# Patient Record
Sex: MALE | Race: BLACK OR AFRICAN AMERICAN
[De-identification: names, ages, dates, MRNs, and addresses within clinical notes are randomized per-mention and may not be internally consistent; named-entity substitution may affect disease eponyms.]

---

## 2018-03-04 ENCOUNTER — HOSPITAL ENCOUNTER (EMERGENCY)
Dept: HOSPITAL 75 - ER | Age: 58
Discharge: HOME | End: 2018-03-04
Payer: SELF-PAY

## 2018-03-04 VITALS — DIASTOLIC BLOOD PRESSURE: 109 MMHG | SYSTOLIC BLOOD PRESSURE: 176 MMHG

## 2018-03-04 VITALS — WEIGHT: 160 LBS | HEIGHT: 72 IN | BODY MASS INDEX: 21.67 KG/M2

## 2018-03-04 DIAGNOSIS — I10: ICD-10-CM

## 2018-03-04 DIAGNOSIS — Z79.84: ICD-10-CM

## 2018-03-04 DIAGNOSIS — E11.9: Primary | ICD-10-CM

## 2018-03-04 PROCEDURE — 99281 EMR DPT VST MAYX REQ PHY/QHP: CPT

## 2018-03-04 NOTE — ED GENERAL
General


Chief Complaint:  General Problems/Pain


Stated Complaint:  DIABETIC OUT OF PILLS/


Source of Information:  Patient, Family


Exam Limitations:  No Limitations





History of Present Illness


Date Seen by Provider:  Mar 4, 2018


Time Seen by Provider:  09:17


Initial Comments


This 57-year-old male presents for medication refills.  Patient is hypertensive 

and diabetic and is out of his metformin and lisinopril.





The patient's blood sugars with his metformin have been running between 170 and 

230.  Patient takes 1000 mg of metformin twice a day.  The patient's blood 

pressure has been treated with lisinopril.  The patient has not been tracking 

his blood pressure at home.





Patient denies headache, blurred vision, stiff neck, syncopal episodes, or 

worsening of any paresthesias to his arms and legs.





Allergies and Home Medications


Allergies


Coded Allergies:  


     No Known Drug Allergies (Unverified , 3/4/18)





Home Medications


Metformin HCl 1,000 Mg Tablet, 1,000 MG PO BID, (Reported)





Patient Home Medication List


Home Medication List Reviewed:  Yes





Constitutional:  No chills, No fever


EENTM:  No hearing loss, No blurred vision


Respiratory:  No cough


Cardiovascular:  No chest pain


Gastrointestinal:  No abdominal pain, No diarrhea, No nausea


Genitourinary:  no symptoms reported


Musculoskeletal:  No back pain


Skin:  no symptoms reported, No rash


Psychiatric/Neurological:  No Symptoms Reported


Hematologic/Lymphatic:  No Symptoms Reported


Immunological/Allergic:  no symptoms reported





Past Medical-Social-Family Hx


Patient Social History


Alcohol Use:  Denies Use


Recreational Drug Use:  Yes


Smoking Status:  Never a Smoker


Recent Foreign Travel:  No


Contact w/Someone Who Travel:  No





Surgeries


History of Surgeries:  No





Respiratory


History of Respiratory Disorde:  No





Cardiovascular


History of Cardiac Disorders:  No





Neurological


History of Neurological Disord:  No





Genitourinary


History of Genitourinary Disor:  No





Gastrointestinal


History of Gastrointestinal Di:  No





Musculoskeletal


History of Musculoskeletal Dis:  No





Endocrine


History of Endocrine Disorders:  Yes


Endocrine Disorders:  Diabetes, Non-Insulin dep





Integumentary


History of Skin or Integumenta:  No





Reviewed Nursing Assessment


Reviewed/Agree w Nursing PMH:  Yes





Physical Exam


Vital Signs





Vital Signs - First Documented








 3/4/18





 08:59


 


Temp 98.0


 


Pulse 79


 


Resp 18


 


B/P (MAP) 176/109 (131)


 


Pulse Ox 98





Capillary Refill :


General Appearance:  No Apparent Distress, WD/WN


Eyes:  Bilateral Eye Normal Inspection


HEENT:  Normal ENT Inspection


Neck:  Normal Inspection


Respiratory:  Chest Non Tender, Lungs Clear, Normal Breath Sounds


Cardiovascular:  Regular Rate, Rhythm, No Edema, No Murmur


Gastrointestinal:  Normal Bowel Sounds, No Organomegaly


Extremity:  Normal Capillary Refill, Normal Inspection, Normal Range of Motion


Neurologic/Psychiatric:  Oriented x3, No Motor/Sensory Deficits, Normal Mood/

Affect


Skin:  Normal Color, Warm/Dry





Progress/Results/Core Measures


Suspected Sepsis


SIRS


Temperature: 


Pulse:  


Respiratory Rate: 


 


Blood Pressure  / 


Mean:





Results/Orders


Vital Signs/I&O





Vital Sign - Last 12Hours








 3/4/18





 08:59


 


Temp 98.0


 


Pulse 79


 


Resp 18


 


B/P (MAP) 176/109 (131)


 


Pulse Ox 98





Capillary Refill :


Progress Note :  


   Time:  09:24


Progress Note


I refilled the patient's metformin 1000 mg twice a day and lisinopril 20 mg 

daily.  I started patient on 10 mg of lisinopril for a week and then ask him to 

increase to his old dose of 20 mg daily if his blood pressure was still 

elevated.  I strongly encouraged him to follow up with his caregiver of choice 

within the next 1-2 weeks for further evaluation.  He was invited to return to 

the emergency department if he had a further problems or questions.





ECG


Initial ECG Impression Date:  Mar 4, 2018





Departure


Impression


Impression:  


 Primary Impression:  


 Diabetes


 Qualified Codes:  E11.9 - Type 2 diabetes mellitus without complications


 Additional Impression:  


 Hypertension


 Qualified Codes:  I10 - Essential (primary) hypertension


Disposition:  01 HOME, SELF-CARE


Condition:  Unchanged





Departure-Patient Inst.


Decision time for Depature:  09:25


Referrals:  


NO,LOCAL PHYSICIAN (PCP)


Primary Care Physician


Patient Instructions:  DIABETES, High Blood Pressure (DC)





Add. Discharge Instructions:  


Metformin and lisinopril as prescribed.  Close follow-up





Choice.  Return if any problems.  All discharge instructions reviewed with 

patient and/or family. Voiced understanding.











CONI SNYDER MD Mar 4, 2018 09:22

## 2018-11-22 ENCOUNTER — HOSPITAL ENCOUNTER (EMERGENCY)
Age: 58
Discharge: HOME | End: 2018-11-22
Attending: EMERGENCY MEDICINE
Payer: SELF-PAY

## 2018-11-22 ENCOUNTER — APPOINTMENT (OUTPATIENT)
Dept: GENERAL RADIOLOGY | Age: 58
End: 2018-11-22
Attending: NURSE PRACTITIONER
Payer: SELF-PAY

## 2018-11-22 VITALS
RESPIRATION RATE: 16 BRPM | OXYGEN SATURATION: 98 % | HEART RATE: 85 BPM | BODY MASS INDEX: 22.35 KG/M2 | TEMPERATURE: 98.2 F | DIASTOLIC BLOOD PRESSURE: 89 MMHG | WEIGHT: 165 LBS | HEIGHT: 72 IN | SYSTOLIC BLOOD PRESSURE: 183 MMHG

## 2018-11-22 DIAGNOSIS — S16.1XXA STRAIN OF NECK MUSCLE, INITIAL ENCOUNTER: ICD-10-CM
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
## 2018-11-22 PROCEDURE — 99283 EMERGENCY DEPT VISIT LOW MDM: CPT | Performed by: NURSE PRACTITIONER

## 2018-11-22 PROCEDURE — 72040 X-RAY EXAM NECK SPINE 2-3 VW: CPT

## 2018-11-22 PROCEDURE — 74011250637 HC RX REV CODE- 250/637: Performed by: NURSE PRACTITIONER

## 2018-11-22 PROCEDURE — 72100 X-RAY EXAM L-S SPINE 2/3 VWS: CPT

## 2018-11-22 RX ORDER — METHOCARBAMOL 750 MG/1
750 TABLET, FILM COATED ORAL 3 TIMES DAILY
Qty: 30 TAB | Refills: 0 | Status: SHIPPED | OUTPATIENT
Start: 2018-11-22

## 2018-11-22 RX ORDER — METFORMIN HYDROCHLORIDE 1000 MG/1
1000 TABLET ORAL 2 TIMES DAILY WITH MEALS
Qty: 60 TAB | Refills: 3 | Status: SHIPPED | OUTPATIENT
Start: 2018-11-22

## 2018-11-22 RX ORDER — LISINOPRIL 20 MG/1
TABLET ORAL DAILY
COMMUNITY
End: 2018-11-22 | Stop reason: SDUPTHER

## 2018-11-22 RX ORDER — MELOXICAM 7.5 MG/1
7.5 TABLET ORAL DAILY
Qty: 7 TAB | Refills: 0 | Status: SHIPPED | OUTPATIENT
Start: 2018-11-22

## 2018-11-22 RX ORDER — METFORMIN HYDROCHLORIDE 1000 MG/1
1000 TABLET ORAL 2 TIMES DAILY WITH MEALS
COMMUNITY
End: 2018-11-22 | Stop reason: SDUPTHER

## 2018-11-22 RX ORDER — LISINOPRIL 20 MG/1
20 TABLET ORAL DAILY
Qty: 30 TAB | Refills: 3 | Status: SHIPPED | OUTPATIENT
Start: 2018-11-22

## 2018-11-22 RX ORDER — LISINOPRIL 20 MG/1
20 TABLET ORAL
Status: COMPLETED | OUTPATIENT
Start: 2018-11-22 | End: 2018-11-22

## 2018-11-22 RX ADMIN — LISINOPRIL 20 MG: 20 TABLET ORAL at 13:14

## 2018-11-22 NOTE — DISCHARGE INSTRUCTIONS
Medications as prescribed  Stretches provided will help with pain. Follow up with primary care for a recheck if symptoms continue or fail to improve  Return to the Emergency Department for any new or worse symptoms.
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
## 2018-11-22 NOTE — ED NOTES
I have reviewed discharge instructions with the patient and spouse. The patient and spouse verbalized understanding. Patient left ED via Discharge Method: ambulatory to Home with spouse Opportunity for questions and clarification provided. Patient given 4 scripts. To continue your aftercare when you leave the hospital, you may receive an automated call from our care team to check in on how you are doing. This is a free service and part of our promise to provide the best care and service to meet your aftercare needs.  If you have questions, or wish to unsubscribe from this service please call [phone]. Thank you for Choosing our [facility] Emergency Department.

## 2021-06-12 ENCOUNTER — HOSPITAL ENCOUNTER (EMERGENCY)
Age: 61
Discharge: HOME | End: 2021-06-12
Attending: EMERGENCY MEDICINE

## 2021-06-12 VITALS
HEIGHT: 72 IN | HEART RATE: 73 BPM | SYSTOLIC BLOOD PRESSURE: 137 MMHG | WEIGHT: 175 LBS | RESPIRATION RATE: 18 BRPM | BODY MASS INDEX: 23.7 KG/M2 | OXYGEN SATURATION: 99 % | DIASTOLIC BLOOD PRESSURE: 91 MMHG | TEMPERATURE: 97.6 F

## 2021-06-12 DIAGNOSIS — Z76.0 MEDICATION REFILL: Primary | ICD-10-CM

## 2021-06-12 LAB
GLUCOSE BLD STRIP.AUTO-MCNC: 183 MG/DL (ref 65–117)
SERVICE CMNT-IMP: ABNORMAL

## 2021-06-12 PROCEDURE — 74011250637 HC RX REV CODE- 250/637: Performed by: EMERGENCY MEDICINE

## 2021-06-12 PROCEDURE — 82962 GLUCOSE BLOOD TEST: CPT

## 2021-06-12 PROCEDURE — 99283 EMERGENCY DEPT VISIT LOW MDM: CPT

## 2021-06-12 RX ORDER — LISINOPRIL 10 MG/1
20 TABLET ORAL DAILY
Qty: 60 TABLET | Refills: 0 | Status: SHIPPED | OUTPATIENT
Start: 2021-06-12 | End: 2021-07-12

## 2021-06-12 RX ORDER — LISINOPRIL 20 MG/1
20 TABLET ORAL
Status: COMPLETED | OUTPATIENT
Start: 2021-06-12 | End: 2021-06-12

## 2021-06-12 RX ORDER — METFORMIN HYDROCHLORIDE 1000 MG/1
1000 TABLET ORAL 2 TIMES DAILY WITH MEALS
Qty: 60 TABLET | Refills: 0 | Status: SHIPPED | OUTPATIENT
Start: 2021-06-12 | End: 2021-07-12

## 2021-06-12 RX ADMIN — LISINOPRIL 20 MG: 20 TABLET ORAL at 11:50

## 2021-06-12 NOTE — ED NOTES
Patient presents to ED with c/o medication refill on high blood pressure and diabetes medication. Patient is alert and oriented x 4 and in no acute distress at this time. Respirations are at a regular rate, depth, and pattern. Patient updated on plan of care and has no questions or concerns at this time. Call bell within reach. Will continue to monitor. Please reference nursing assessment. Emergency Department Nursing Plan of Care       The Nursing Plan of Care is developed from the Nursing assessment and Emergency Department Attending provider initial evaluation. The plan of care may be reviewed in the ED Provider note.     The Plan of Care was developed with the following considerations:   Patient / Family readiness to learn indicated by:verbalized understanding and successful return demonstration  Persons(s) to be included in education: patient  Barriers to Learning/Limitations:No    Signed     Severa Collie, RN    6/12/2021   11:36 AM

## 2021-06-12 NOTE — ED PROVIDER NOTES
EMERGENCY DEPARTMENT HISTORY AND PHYSICAL EXAM      Date: 6/12/2021  Patient Name: Lizbeth Cobb    History of Presenting Illness     Chief Complaint   Patient presents with    Medication Refill     asking fro script for Metformin and Lisinopril       History Provided By: Patient    HPI: Lizbeth Cobb, 62 y.o. male with PMHx significant for DM, HTN, presents ambulatory to the ED for medication refill. States he has been out of his HTN medications x 2 weeks and DM x 8 days. Reports does not currently have insurance. Denies any associated symptoms. Denies any alleviating or exacerbating factors. Pt specifically denies any fever, chills, CP, SOB, abd pain, NVD. There are no other complaints, changes, or physical findings at this time. PCP: None    No current facility-administered medications on file prior to encounter. Current Outpatient Medications on File Prior to Encounter   Medication Sig Dispense Refill    methocarbamol (ROBAXIN) 750 mg tablet Take 1 Tab by mouth three (3) times daily. 30 Tab 0    meloxicam (MOBIC) 7.5 mg tablet Take 1 Tab by mouth daily. 7 Tab 0    lisinopril (PRINIVIL, ZESTRIL) 20 mg tablet Take 1 Tab by mouth daily. 30 Tab 3    metFORMIN (GLUCOPHAGE) 1,000 mg tablet Take 1 Tab by mouth two (2) times daily (with meals). 61 Tab 3       Past History     Past Medical History:  Past Medical History:   Diagnosis Date    Diabetes ([address]     Hypertension        Past Surgical History:  No past surgical history on file. Family History:  No family history on file. Social History:  Social History     Tobacco Use    Smoking status: Current Every Day Smoker   Substance Use Topics    Alcohol use: Yes     Comment: socially    Drug use: Not on file       Allergies:  No Known Allergies      Review of Systems   Review of Systems   Constitutional: Negative for chills, fatigue and fever. HENT: Negative. Negative for sore throat. Eyes: Negative.     Respiratory: Negative for cough and shortness of breath. Cardiovascular: Negative for chest pain and palpitations. Gastrointestinal: Negative for abdominal pain, nausea and vomiting. Genitourinary: Negative for dysuria. Musculoskeletal: Negative. Skin: Negative. Neurological: Negative for dizziness, weakness, light-headedness and headaches. Psychiatric/Behavioral: Negative. All other systems reviewed and are negative. Physical Exam   Physical Exam  Vitals and nursing note reviewed. Constitutional:       General: He is not in acute distress. Appearance: He is well-developed. HENT:      Head: Normocephalic and atraumatic. Eyes:      Conjunctiva/sclera: Conjunctivae normal.      Pupils: Pupils are equal, round, and reactive to light. Cardiovascular:      Rate and Rhythm: Normal rate and regular rhythm. Heart sounds: Normal heart sounds. Pulmonary:      Effort: Pulmonary effort is normal. No respiratory distress. Breath sounds: Normal breath sounds. No wheezing. Abdominal:      General: Bowel sounds are normal. There is no distension. Palpations: Abdomen is soft. Tenderness: There is no abdominal tenderness. Musculoskeletal:         General: No tenderness. Normal range of motion. Cervical back: Normal range of motion and neck supple. Skin:     General: Skin is warm and dry. Findings: No rash. Neurological:      Mental Status: He is alert and oriented to person, place, and time. Cranial Nerves: No cranial nerve deficit.    Psychiatric:         Behavior: Behavior normal.         Diagnostic Study Results     Labs -     Recent Results (from the past 12 hour(s))   GLUCOSE, POC    Collection Time: 06/12/21 11:51 AM   Result Value Ref Range    Glucose (POC) 183 (H) 65 - 117 mg/dL    Performed by Delphine Mackay        Radiologic Studies -   No orders to display     CT Results  (Last 48 hours)    None        CXR Results  (Last 48 hours)    None            Medical Decision Making I am the first provider for this patient. I reviewed the vital signs, available nursing notes, past medical history, past surgical history, family history and social history. Vital Signs-Reviewed the patient's vital signs. Patient Vitals for the past 12 hrs:   Temp Pulse Resp BP SpO2   06/12/21 1122 97.6 °F (36.4 °C) 73 18 (!) 150/103 99 %     Records Reviewed: Nursing Notes, Old Medical Records, Previous electrocardiograms, Previous Radiology Studies and Previous Laboratory Studies    Provider Notes (Medical Decision Making):   DDx; medication refill, uncontrolled HTN, diabetes     ED Course:   Initial assessment performed. The patients presenting problems have been discussed, and they are in agreement with the care plan formulated and outlined with them. I have encouraged them to ask questions as they arise throughout their visit. Critical Care Time:   none    Disposition:  DISCHARGE  11:39 AM  The patient has been re-evaluated and is ready for discharge. Reviewed available results with patient. Counseled pt on diagnosis and care plan. Pt has expressed understanding, and all questions have been answered. Pt agrees with plan and agrees to follow up as recommended, or return to the ED if their symptoms worsen. Discharge instructions have been provided and explained to the pt, along with reasons to return to the ED. PLAN:  1. Current Discharge Medication List        2. Follow-up Information    None       Return to ED if worse     Diagnosis     Clinical Impression: No diagnosis found. Attestations: This note is prepared by Wood Ontiveros, acting as Scribe for Adalberto Dick MD.    Adalberto Dick MD: The scribe's documentation has been prepared under my direction and personally reviewed by me in its entirety. I confirm that the note above accurately reflects all work, treatment, procedures, and medical decision making performed by me.